# Patient Record
Sex: MALE | Race: WHITE | NOT HISPANIC OR LATINO | Employment: OTHER | URBAN - METROPOLITAN AREA
[De-identification: names, ages, dates, MRNs, and addresses within clinical notes are randomized per-mention and may not be internally consistent; named-entity substitution may affect disease eponyms.]

---

## 2022-04-11 ENCOUNTER — OFFICE VISIT (OUTPATIENT)
Dept: WOUND CARE | Facility: HOSPITAL | Age: 67
End: 2022-04-11
Attending: SURGERY
Payer: MEDICARE

## 2022-04-11 VITALS
HEART RATE: 86 BPM | SYSTOLIC BLOOD PRESSURE: 125 MMHG | DIASTOLIC BLOOD PRESSURE: 72 MMHG | TEMPERATURE: 97 F | RESPIRATION RATE: 18 BRPM

## 2022-04-11 DIAGNOSIS — L97.512 CHRONIC ULCER OF TOE OF RIGHT FOOT WITH FAT LAYER EXPOSED: ICD-10-CM

## 2022-04-11 DIAGNOSIS — L97.519: ICD-10-CM

## 2022-04-11 DIAGNOSIS — I70.235 ATHEROSCLEROSIS OF NATIVE ARTERY OF RIGHT LOWER EXTREMITY WITH ULCERATION OF OTHER PART OF FOOT: ICD-10-CM

## 2022-04-11 PROBLEM — I70.239 ATHEROSCLEROSIS OF NATIVE ARTERY OF RIGHT LOWER EXTREMITY WITH ULCERATION: Status: ACTIVE | Noted: 2022-04-11

## 2022-04-11 PROCEDURE — 3078F DIAST BP <80 MM HG: CPT | Mod: CPTII,,, | Performed by: SURGERY

## 2022-04-11 PROCEDURE — 3078F PR MOST RECENT DIASTOLIC BLOOD PRESSURE < 80 MM HG: ICD-10-PCS | Mod: CPTII,,, | Performed by: SURGERY

## 2022-04-11 PROCEDURE — 3074F PR MOST RECENT SYSTOLIC BLOOD PRESSURE < 130 MM HG: ICD-10-PCS | Mod: CPTII,,, | Performed by: SURGERY

## 2022-04-11 PROCEDURE — 99499 UNLISTED E&M SERVICE: CPT | Mod: ,,, | Performed by: SURGERY

## 2022-04-11 PROCEDURE — 99499 NO LOS: ICD-10-PCS | Mod: ,,, | Performed by: SURGERY

## 2022-04-11 PROCEDURE — 11042 DBRDMT SUBQ TIS 1ST 20SQCM/<: CPT

## 2022-04-11 PROCEDURE — 27201912 HC WOUND CARE DEBRIDEMENT SUPPLIES

## 2022-04-11 PROCEDURE — 4010F ACE/ARB THERAPY RXD/TAKEN: CPT | Mod: CPTII,,, | Performed by: SURGERY

## 2022-04-11 PROCEDURE — 4010F PR ACE/ARB THEARPY RXD/TAKEN: ICD-10-PCS | Mod: CPTII,,, | Performed by: SURGERY

## 2022-04-11 PROCEDURE — 99204 OFFICE O/P NEW MOD 45 MIN: CPT | Mod: 25

## 2022-04-11 PROCEDURE — 3074F SYST BP LT 130 MM HG: CPT | Mod: CPTII,,, | Performed by: SURGERY

## 2022-04-11 NOTE — ASSESSMENT & PLAN NOTE
Upon examination, the patient was noted to have a palpable posterior tibial pulse in the right foot.  He did have 1+ edema of the foot and lower extremity.  The right great toe wound had black and dark gray eschar with undetermined depth.  There was no swelling of the toe.  Betadine and alcohol will be applied to the toe.

## 2022-04-11 NOTE — H&P
[facility]  Wound Care  History and Physical    Problem List Items Addressed This Visit        Derm    Chronic ulcer of great toe of right foot, with unspecified severity    Overview     Patient began to experience pain in the toes of his right foot with development of blistering near the end of February 2022. He had been treated with multiple rounds of antibiotics.  Without improvement.  He was admitted 03/29/2022 at [facility] for right foot ischemia.  He underwent angiogram with percutaneous intervention in his right lower extremity with re-establishment of blood flow to the right foot.  The patient has noted significant reduction in his pain following the procedure with improvement of the wounds.  He has just completed a course of Levaquin and clindamycin.  He was given a prescription for doxycycline to begin today.  He has developed a wound on the dorsum of the right great toe as well as a wound between the 4th and 5th toe.  He does have chronic swelling of his right foot from a prior injury requiring ORIF metatarsal bones of the right foot.           Current Assessment & Plan     Upon examination, the patient was noted to have a palpable posterior tibial pulse in the right foot.  He did have 1+ edema of the foot and lower extremity.  The right great toe wound had black and dark gray eschar with undetermined depth.  There was no swelling of the toe.  Betadine and alcohol will be applied to the toe.           Chronic ulcer of 4th and 5th toe of right foot with fat layer exposed    Overview     Patient began to experience pain in the toes of his right foot with development of blistering near the end of February 2022. He had been treated with multiple rounds of antibiotics.  Without improvement.  He was admitted 03/29/2022 at [facility] for right foot ischemia.  He underwent angiogram with percutaneous intervention in his right lower extremity with  re-establishment of blood flow to the right foot.  The patient has noted significant reduction in his pain following the procedure with improvement of the wounds.  He has just completed a course of Levaquin and clindamycin.  He was given a prescription for doxycycline to begin today.  He has developed a wound on the dorsum of the right great toe as well as a wound between the 4th and 5th toe.  He does have chronic swelling of his right foot from a prior injury requiring ORIF metatarsal bones of the right foot.           Current Assessment & Plan     Wounds between the 4th and 5th toe of the right foot have modest amount of nonviable tissue.  Debridement was performed to remove nonviable tissue.  Begin Santyl.  Gentian violet to the periwound skin.              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  This is been treated numerous times over the years.  Patient completed percutaneous intervention of the right lower extremity in early April 2022.  Patient does have known vascular disease of the left lower extremity which is also going to be addressed.           Current Assessment & Plan     Patient now with palpable pulse of the right posterior tibial and improved blood flow.  Improved wound healing is to be expected.  The left lower extremity is being addressed by [facility].  There currently are no wounds or pain on the left lower extremity associated with vascular disease.                   History:    Past Medical History:   Diagnosis Date    Atherosclerosis of artery of both lower extremities     Other hyperlipidemia        Past Surgical History:   Procedure Laterality Date    ORIF of left femur      Percutaneous angioplasty right femoral artery         No family history on file.     has no history on file for tobacco use, alcohol use, and drug use.    currently has no medications  in their medication list.    Allergies:  Patient has no allergy information on record.    Review of Systems:  Review of Systems   Constitutional: Negative for chills, diaphoresis, fever and weight loss.   HENT: Negative for nosebleeds and sore throat.    Eyes: Negative for photophobia and pain.   Respiratory: Negative for cough, hemoptysis and shortness of breath.    Cardiovascular: Negative for chest pain and orthopnea.   Gastrointestinal: Negative for abdominal pain, nausea and vomiting.   Genitourinary: Negative for dysuria and hematuria.   Musculoskeletal: Negative for falls and neck pain.   Skin: Negative for itching and rash.   Neurological: Negative for focal weakness and seizures.   Endo/Heme/Allergies: Does not bruise/bleed easily.   Psychiatric/Behavioral: Negative for hallucinations.         Vitals:    04/11/22 1256   BP: 125/72   Pulse: 86   Resp: 18   Temp: 97.3 °F (36.3 °C)         BMI:  There is no height or weight on file to calculate BMI.    Physical Exam:  Physical Exam  Vitals reviewed.   Constitutional:       General: He is not in acute distress.     Appearance: Normal appearance.   HENT:      Head: Normocephalic and atraumatic.      Mouth/Throat:      Mouth: Mucous membranes are moist.   Eyes:      General: No scleral icterus.     Extraocular Movements: Extraocular movements intact.      Pupils: Pupils are equal, round, and reactive to light.   Cardiovascular:      Rate and Rhythm: Normal rate and regular rhythm.      Comments: Patient has a palpable right femoral pulse.  Right posterior tibial pulses also palpable and bounding.  The patient does not have a palpable dorsalis pedis pulse on the right.  I am unable to identify palpable pulses in the left foot.  Pulmonary:      Effort: Pulmonary effort is normal. No respiratory distress.      Breath sounds: Normal breath sounds.   Abdominal:      General: Abdomen is flat. Bowel sounds are normal.      Palpations: Abdomen is soft.      Tenderness:  There is no abdominal tenderness.   Musculoskeletal:      Cervical back: Normal range of motion and neck supple.      Right lower leg: Edema (1+) present.      Comments: Patient has limited movement of his right ankle from prior injury.  There is a scar on the medial right foot from prior surgery.  See wound progress note for detailed wound description.     Lymphadenopathy:      Cervical: No cervical adenopathy.   Skin:     Comments: Patient has very poor capillary refill the left foot.  The right foot also has delayed capillary refill the right great toe.   Neurological:      General: No focal deficit present.      Mental Status: He is alert and oriented to person, place, and time.   Psychiatric:         Thought Content: Thought content normal.         Judgment: Judgment normal.         A1C:  No results for input(s): HGBA1C in the last 2160 hours.  BMP:  No results for input(s): GLU, NA, K, CL, CO2, BUN, CREATININE, CALCIUM, MG in the last 2160 hours.   CBC:  No results for input(s): WBC, RBC, HGB, HCT, PLT, MCV, MCH, MCHC in the last 2160 hours.  CMP:  No results for input(s): GLU, CALCIUM, ALBUMIN, PROT, NA, K, CO2, CL, BUN, ALKPHOS, ALT, AST, BILITOT in the last 2160 hours.    Invalid input(s): CREATININ  PREALBUMIN:  No results for input(s): PREALBUMIN in the last 2160 hours.  WOUND CULTURES:  No results for input(s): LABAERO in the last 2160 hours.        Plan:  See Wound Docs note for plan and follow up.        Butch HARKINS Hebert [facility]

## 2022-04-11 NOTE — ASSESSMENT & PLAN NOTE
Patient now with palpable pulse of the right posterior tibial and improved blood flow.  Improved wound healing is to be expected.  The left lower extremity is being addressed by [facility].  There currently are no wounds or pain on the left lower extremity associated with vascular disease.

## 2022-04-11 NOTE — PROGRESS NOTES
[facility]  Wound Care  Progress Note    Problem List Items Addressed This Visit        Derm    Chronic ulcer of great toe of right foot, with unspecified severity    Overview     Patient began to experience pain in the toes of his right foot with development of blistering near the end of February 2022. He had been treated with multiple rounds of antibiotics.  Without improvement.  He was admitted 03/29/2022 at [facility] for right foot ischemia.  He underwent angiogram with percutaneous intervention in his right lower extremity with re-establishment of blood flow to the right foot.  The patient has noted significant reduction in his pain following the procedure with improvement of the wounds.  He has just completed a course of Levaquin and clindamycin.  He was given a prescription for doxycycline to begin today.  He has developed a wound on the dorsum of the right great toe as well as a wound between the 4th and 5th toe.  He does have chronic swelling of his right foot from a prior injury requiring ORIF metatarsal bones of the right foot.           Current Assessment & Plan     Upon examination, the patient was noted to have a palpable posterior tibial pulse in the right foot.  He did have 1+ edema of the foot and lower extremity.  The right great toe wound had black and dark gray eschar with undetermined depth.  There was no swelling of the toe.  Betadine and alcohol will be applied to the toe.           Chronic ulcer of 4th and 5th toe of right foot with fat layer exposed    Overview     Patient began to experience pain in the toes of his right foot with development of blistering near the end of February 2022. He had been treated with multiple rounds of antibiotics.  Without improvement.  He was admitted 03/29/2022 at [facility] for right foot ischemia.  He underwent angiogram with percutaneous intervention in his right lower extremity with  re-establishment of blood flow to the right foot.  The patient has noted significant reduction in his pain following the procedure with improvement of the wounds.  He has just completed a course of Levaquin and clindamycin.  He was given a prescription for doxycycline to begin today.  He has developed a wound on the dorsum of the right great toe as well as a wound between the 4th and 5th toe.  He does have chronic swelling of his right foot from a prior injury requiring ORIF metatarsal bones of the right foot.           Current Assessment & Plan     Wounds between the 4th and 5th toe of the right foot have modest amount of nonviable tissue.  Debridement was performed to remove nonviable tissue.  Begin Santyl.  Gentian violet to the periwound skin.              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  This is been treated numerous times over the years.  Patient completed percutaneous intervention of the right lower extremity in early April 2022.  Patient does have known vascular disease of the left lower extremity which is also going to be addressed.           Current Assessment & Plan     Patient now with palpable pulse of the right posterior tibial and improved blood flow.  Improved wound healing is to be expected.  The left lower extremity is being addressed by [facility].  There currently are no wounds or pain on the left lower extremity associated with vascular disease.                 See wound doc progress notes. Documents will be scanned.        Butch HARKINS Lopez  [facility]

## 2022-04-11 NOTE — ASSESSMENT & PLAN NOTE
Wounds between the 4th and 5th toe of the right foot have modest amount of nonviable tissue.  Debridement was performed to remove nonviable tissue.  Begin Santyl.  Gentian violet to the periwound skin.

## 2022-04-18 ENCOUNTER — OFFICE VISIT (OUTPATIENT)
Dept: WOUND CARE | Facility: HOSPITAL | Age: 67
End: 2022-04-18
Attending: SURGERY
Payer: MEDICARE

## 2022-04-18 VITALS
RESPIRATION RATE: 18 BRPM | HEART RATE: 87 BPM | DIASTOLIC BLOOD PRESSURE: 77 MMHG | TEMPERATURE: 98 F | SYSTOLIC BLOOD PRESSURE: 128 MMHG

## 2022-04-18 DIAGNOSIS — L97.519: ICD-10-CM

## 2022-04-18 DIAGNOSIS — I70.235 ATHEROSCLEROSIS OF NATIVE ARTERY OF RIGHT LOWER EXTREMITY WITH ULCERATION OF OTHER PART OF FOOT: Primary | ICD-10-CM

## 2022-04-18 DIAGNOSIS — L97.512 CHRONIC ULCER OF TOE OF RIGHT FOOT WITH FAT LAYER EXPOSED: ICD-10-CM

## 2022-04-18 PROCEDURE — 99499 NO LOS: ICD-10-PCS | Mod: ,,, | Performed by: SURGERY

## 2022-04-18 PROCEDURE — 99499 UNLISTED E&M SERVICE: CPT | Mod: ,,, | Performed by: SURGERY

## 2022-04-18 PROCEDURE — 3078F DIAST BP <80 MM HG: CPT | Mod: CPTII,,, | Performed by: SURGERY

## 2022-04-18 PROCEDURE — 3074F SYST BP LT 130 MM HG: CPT | Mod: CPTII,,, | Performed by: SURGERY

## 2022-04-18 PROCEDURE — 4010F PR ACE/ARB THEARPY RXD/TAKEN: ICD-10-PCS | Mod: CPTII,,, | Performed by: SURGERY

## 2022-04-18 PROCEDURE — 3074F PR MOST RECENT SYSTOLIC BLOOD PRESSURE < 130 MM HG: ICD-10-PCS | Mod: CPTII,,, | Performed by: SURGERY

## 2022-04-18 PROCEDURE — 4010F ACE/ARB THERAPY RXD/TAKEN: CPT | Mod: CPTII,,, | Performed by: SURGERY

## 2022-04-18 PROCEDURE — 11042 DBRDMT SUBQ TIS 1ST 20SQCM/<: CPT

## 2022-04-18 PROCEDURE — 3078F PR MOST RECENT DIASTOLIC BLOOD PRESSURE < 80 MM HG: ICD-10-PCS | Mod: CPTII,,, | Performed by: SURGERY

## 2022-04-18 PROCEDURE — 27201912 HC WOUND CARE DEBRIDEMENT SUPPLIES

## 2022-04-18 NOTE — PROGRESS NOTES
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  This is been treated numerous times over the years.  Patient completed percutaneous intervention of the right lower extremity in early April 2022.  Patient does have known vascular disease of the left lower extremity which is also going to be addressed.              Orthopedic    Chronic ulcer of great toe of right foot, with unspecified severity    Overview     Patient began to experience pain in the toes of his right foot with development of blistering near the end of February 2022. He had been treated with multiple rounds of antibiotics.  Without improvement.  He was admitted 03/29/2022 at [facility] for right foot ischemia.  He underwent angiogram with percutaneous intervention in his right lower extremity with re-establishment of blood flow to the right foot.  The patient has noted significant reduction in his pain following the procedure with improvement of the wounds.  He has just completed a course of Levaquin and clindamycin.  He was given a prescription for doxycycline to begin today.  He has developed a wound on the dorsum of the right great toe as well as a wound between the 4th and 5th toe.  He does have chronic swelling of his right foot from a prior injury requiring ORIF metatarsal bones of the right foot.           Current Assessment & Plan     Wound is drier.  There is less erythema around the wound.  Excisional debridement performed.  Continue Santyl.           Chronic ulcer of 4th and 5th toe of right foot with fat layer exposed    Overview     Patient began to experience pain in the toes of his right foot with development of blistering near the end of February 2022. He had been  treated with multiple rounds of antibiotics.  Without improvement.  He was admitted 03/29/2022 at [facility] for right foot ischemia.  He underwent angiogram with percutaneous intervention in his right lower extremity with re-establishment of blood flow to the right foot.  The patient has noted significant reduction in his pain following the procedure with improvement of the wounds.  He has just completed a course of Levaquin and clindamycin.  He was given a prescription for doxycycline to begin today.  He has developed a wound on the dorsum of the right great toe as well as a wound between the 4th and 5th toe.  He does have chronic swelling of his right foot from a prior injury requiring ORIF metatarsal bones of the right foot.           Current Assessment & Plan     Wound is markedly improved.  Excisional debridement performed to maintain active phase wound healing remove nonviable tissue.  Continue Santyl.                 See wound doc progress notes. Documents will be scanned.        Butch Lopez  [facility]

## 2022-04-18 NOTE — ASSESSMENT & PLAN NOTE
Wound is markedly improved.  Excisional debridement performed to maintain active phase wound healing remove nonviable tissue.  Continue Santyl.

## 2022-04-18 NOTE — ASSESSMENT & PLAN NOTE
Wound is drier.  There is less erythema around the wound.  Excisional debridement performed.  Continue Santyl.

## 2022-04-25 ENCOUNTER — OFFICE VISIT (OUTPATIENT)
Dept: WOUND CARE | Facility: HOSPITAL | Age: 67
End: 2022-04-25
Attending: SURGERY
Payer: MEDICARE

## 2022-04-25 VITALS
HEART RATE: 79 BPM | TEMPERATURE: 98 F | DIASTOLIC BLOOD PRESSURE: 69 MMHG | RESPIRATION RATE: 19 BRPM | SYSTOLIC BLOOD PRESSURE: 133 MMHG

## 2022-04-25 DIAGNOSIS — L97.512 CHRONIC ULCER OF TOE OF RIGHT FOOT WITH FAT LAYER EXPOSED: ICD-10-CM

## 2022-04-25 DIAGNOSIS — L97.519: ICD-10-CM

## 2022-04-25 PROCEDURE — 3078F PR MOST RECENT DIASTOLIC BLOOD PRESSURE < 80 MM HG: ICD-10-PCS | Mod: CPTII,,, | Performed by: SURGERY

## 2022-04-25 PROCEDURE — 27201912 HC WOUND CARE DEBRIDEMENT SUPPLIES

## 2022-04-25 PROCEDURE — 3078F DIAST BP <80 MM HG: CPT | Mod: CPTII,,, | Performed by: SURGERY

## 2022-04-25 PROCEDURE — 3075F PR MOST RECENT SYSTOLIC BLOOD PRESS GE 130-139MM HG: ICD-10-PCS | Mod: CPTII,,, | Performed by: SURGERY

## 2022-04-25 PROCEDURE — 3075F SYST BP GE 130 - 139MM HG: CPT | Mod: CPTII,,, | Performed by: SURGERY

## 2022-04-25 PROCEDURE — 4010F PR ACE/ARB THEARPY RXD/TAKEN: ICD-10-PCS | Mod: CPTII,,, | Performed by: SURGERY

## 2022-04-25 PROCEDURE — 11042 DBRDMT SUBQ TIS 1ST 20SQCM/<: CPT

## 2022-04-25 PROCEDURE — 4010F ACE/ARB THERAPY RXD/TAKEN: CPT | Mod: CPTII,,, | Performed by: SURGERY

## 2022-04-25 PROCEDURE — 99499 NO LOS: ICD-10-PCS | Mod: ,,, | Performed by: SURGERY

## 2022-04-25 PROCEDURE — 99499 UNLISTED E&M SERVICE: CPT | Mod: ,,, | Performed by: SURGERY

## 2022-04-25 NOTE — PROGRESS NOTES
[facility]  Wound Care  Progress Note    Problem List Items Addressed This Visit     Chronic ulcer of great toe of right foot, with unspecified severity    Overview     Patient began to experience pain in the toes of his right foot with development of blistering near the end of February 2022. He had been treated with multiple rounds of antibiotics.  Without improvement.  He was admitted 03/29/2022 at [facility] for right foot ischemia.  He underwent angiogram with percutaneous intervention in his right lower extremity with re-establishment of blood flow to the right foot.  The patient has noted significant reduction in his pain following the procedure with improvement of the wounds.  He has just completed a course of Levaquin and clindamycin.  He was given a prescription for doxycycline to begin today.  He has developed a wound on the dorsum of the right great toe as well as a wound between the 4th and 5th toe.  He does have chronic swelling of his right foot from a prior injury requiring ORIF metatarsal bones of the right foot.           Current Assessment & Plan     Significantly decreased pain.  Repeat angiogram schedule for beginning of May.  Scab overlying the right great toe came off this weekend to reveal a small full thickness arterial ulcer.  Wound is clean with no surrounding erythema.  Wound debrided today.  Will implement collagen dressing.           Chronic ulcer of 4th and 5th toe of right foot with fat layer exposed    Overview     Patient began to experience pain in the toes of his right foot with development of blistering near the end of February 2022. He had been treated with multiple rounds of antibiotics.  Without improvement.  He was admitted 03/29/2022 at [facility] for right foot ischemia.  He underwent angiogram with percutaneous intervention in his right lower extremity with re-establishment of blood flow to the right foot.  The  patient has noted significant reduction in his pain following the procedure with improvement of the wounds.  He has just completed a course of Levaquin and clindamycin.  He was given a prescription for doxycycline to begin today.  He has developed a wound on the dorsum of the right great toe as well as a wound between the 4th and 5th toe.  He does have chronic swelling of his right foot from a prior injury requiring ORIF metatarsal bones of the right foot.           Current Assessment & Plan     No open wound between the 4th and 5th toes at this time.  There is just some macerated tissue.  Will implement silver dressing.                 See wound doc progress notes. Documents will be scanned.        Antony ODELL Prieto  [facility]

## 2022-04-25 NOTE — ASSESSMENT & PLAN NOTE
Significantly decreased pain.  Repeat angiogram schedule for beginning of May.  Scab overlying the right great toe came off this weekend to reveal a small full thickness arterial ulcer.  Wound is clean with no surrounding erythema.  Wound debrided today.  Will implement collagen dressing.

## 2022-04-25 NOTE — ASSESSMENT & PLAN NOTE
No open wound between the 4th and 5th toes at this time.  There is just some macerated tissue.  Will implement silver dressing.

## 2022-05-09 ENCOUNTER — OFFICE VISIT (OUTPATIENT)
Dept: WOUND CARE | Facility: HOSPITAL | Age: 67
End: 2022-05-09
Attending: SURGERY
Payer: MEDICARE

## 2022-05-09 VITALS
DIASTOLIC BLOOD PRESSURE: 78 MMHG | RESPIRATION RATE: 18 BRPM | TEMPERATURE: 98 F | HEART RATE: 83 BPM | SYSTOLIC BLOOD PRESSURE: 115 MMHG

## 2022-05-09 DIAGNOSIS — L97.519: ICD-10-CM

## 2022-05-09 DIAGNOSIS — L97.512 CHRONIC ULCER OF TOE OF RIGHT FOOT WITH FAT LAYER EXPOSED: ICD-10-CM

## 2022-05-09 DIAGNOSIS — I70.235 ATHEROSCLEROSIS OF NATIVE ARTERY OF RIGHT LOWER EXTREMITY WITH ULCERATION OF OTHER PART OF FOOT: ICD-10-CM

## 2022-05-09 PROCEDURE — 3078F DIAST BP <80 MM HG: CPT | Mod: CPTII,,, | Performed by: SURGERY

## 2022-05-09 PROCEDURE — 11042 DBRDMT SUBQ TIS 1ST 20SQCM/<: CPT

## 2022-05-09 PROCEDURE — 4010F PR ACE/ARB THEARPY RXD/TAKEN: ICD-10-PCS | Mod: CPTII,,, | Performed by: SURGERY

## 2022-05-09 PROCEDURE — 3074F SYST BP LT 130 MM HG: CPT | Mod: CPTII,,, | Performed by: SURGERY

## 2022-05-09 PROCEDURE — 99499 UNLISTED E&M SERVICE: CPT | Mod: ,,, | Performed by: SURGERY

## 2022-05-09 PROCEDURE — 3078F PR MOST RECENT DIASTOLIC BLOOD PRESSURE < 80 MM HG: ICD-10-PCS | Mod: CPTII,,, | Performed by: SURGERY

## 2022-05-09 PROCEDURE — 99499 NO LOS: ICD-10-PCS | Mod: ,,, | Performed by: SURGERY

## 2022-05-09 PROCEDURE — 3074F PR MOST RECENT SYSTOLIC BLOOD PRESSURE < 130 MM HG: ICD-10-PCS | Mod: CPTII,,, | Performed by: SURGERY

## 2022-05-09 PROCEDURE — 27201912 HC WOUND CARE DEBRIDEMENT SUPPLIES

## 2022-05-09 PROCEDURE — 4010F ACE/ARB THERAPY RXD/TAKEN: CPT | Mod: CPTII,,, | Performed by: SURGERY

## 2022-05-09 NOTE — PROGRESS NOTES
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  This is been treated numerous times over the years.  Patient completed percutaneous intervention of the right lower extremity in early April 2022.  Patient does have known vascular disease of the left lower extremity which is also going to be addressed.           Current Assessment & Plan     Wounds are healing well.              Orthopedic    Chronic ulcer of great toe of right foot, with unspecified severity    Overview     Patient began to experience pain in the toes of his right foot with development of blistering near the end of February 2022. He had been treated with multiple rounds of antibiotics.  Without improvement.  He was admitted 03/29/2022 at [facility] for right foot ischemia.  He underwent angiogram with percutaneous intervention in his right lower extremity with re-establishment of blood flow to the right foot.  The patient has noted significant reduction in his pain following the procedure with improvement of the wounds.  He has just completed a course of Levaquin and clindamycin.  He was given a prescription for doxycycline to begin today.  He has developed a wound on the dorsum of the right great toe as well as a wound between the 4th and 5th toe.  He does have chronic swelling of his right foot from a prior injury requiring ORIF metatarsal bones of the right foot.  As of 05/09/2022, the wound is healed           Current Assessment & Plan     As of 05/09/2022, the wound is healed  Vaseline to the wound daily for 4 weeks           Chronic ulcer of 4th and 5th toe of right foot with fat layer exposed    Overview     Patient began to experience pain in the toes of his right foot with development  of blistering near the end of February 2022. He had been treated with multiple rounds of antibiotics.  Without improvement.  He was admitted 03/29/2022 at [facility] for right foot ischemia.  He underwent angiogram with percutaneous intervention in his right lower extremity with re-establishment of blood flow to the right foot.  The patient has noted significant reduction in his pain following the procedure with improvement of the wounds.  He has just completed a course of Levaquin and clindamycin.  He was given a prescription for doxycycline to begin today.  He has developed a wound on the dorsum of the right great toe as well as a wound between the 4th and 5th toe.  He does have chronic swelling of his right foot from a prior injury requiring ORIF metatarsal bones of the right foot.           Current Assessment & Plan     Wound on the 5th toe is now healed as of 05/09/2022.  The wound on the 4th toe is improving rapidly.  Continue debridement to remove nonviable tissue maintain active phase wound healing.  Continue silver alginate.  Expect wound to be healed in the next 1-2 weeks.                 See wound doc progress notes. Documents will be scanned.        Butch Lopez  [facility]

## 2022-05-09 NOTE — ASSESSMENT & PLAN NOTE
Wound on the 5th toe is now healed as of 05/09/2022.  The wound on the 4th toe is improving rapidly.  Continue debridement to remove nonviable tissue maintain active phase wound healing.  Continue silver alginate.  Expect wound to be healed in the next 1-2 weeks.

## 2022-05-23 ENCOUNTER — OFFICE VISIT (OUTPATIENT)
Dept: WOUND CARE | Facility: HOSPITAL | Age: 67
End: 2022-05-23
Attending: SURGERY
Payer: MEDICARE

## 2022-05-23 VITALS
RESPIRATION RATE: 18 BRPM | HEART RATE: 67 BPM | SYSTOLIC BLOOD PRESSURE: 112 MMHG | DIASTOLIC BLOOD PRESSURE: 59 MMHG | TEMPERATURE: 97 F

## 2022-05-23 DIAGNOSIS — I70.235 ATHEROSCLEROSIS OF NATIVE ARTERY OF RIGHT LOWER EXTREMITY WITH ULCERATION OF OTHER PART OF FOOT: ICD-10-CM

## 2022-05-23 DIAGNOSIS — L97.512 CHRONIC ULCER OF TOE OF RIGHT FOOT WITH FAT LAYER EXPOSED: ICD-10-CM

## 2022-05-23 PROBLEM — L97.519: Status: RESOLVED | Noted: 2022-04-11 | Resolved: 2022-05-23

## 2022-05-23 PROCEDURE — 99499 NO LOS: ICD-10-PCS | Mod: ,,, | Performed by: SURGERY

## 2022-05-23 PROCEDURE — 3078F DIAST BP <80 MM HG: CPT | Mod: CPTII,,, | Performed by: SURGERY

## 2022-05-23 PROCEDURE — 4010F ACE/ARB THERAPY RXD/TAKEN: CPT | Mod: CPTII,,, | Performed by: SURGERY

## 2022-05-23 PROCEDURE — 3074F PR MOST RECENT SYSTOLIC BLOOD PRESSURE < 130 MM HG: ICD-10-PCS | Mod: CPTII,,, | Performed by: SURGERY

## 2022-05-23 PROCEDURE — 99213 OFFICE O/P EST LOW 20 MIN: CPT

## 2022-05-23 PROCEDURE — 4010F PR ACE/ARB THEARPY RXD/TAKEN: ICD-10-PCS | Mod: CPTII,,, | Performed by: SURGERY

## 2022-05-23 PROCEDURE — 1159F MED LIST DOCD IN RCRD: CPT | Mod: CPTII,,, | Performed by: SURGERY

## 2022-05-23 PROCEDURE — 3078F PR MOST RECENT DIASTOLIC BLOOD PRESSURE < 80 MM HG: ICD-10-PCS | Mod: CPTII,,, | Performed by: SURGERY

## 2022-05-23 PROCEDURE — 99499 UNLISTED E&M SERVICE: CPT | Mod: ,,, | Performed by: SURGERY

## 2022-05-23 PROCEDURE — 1159F PR MEDICATION LIST DOCUMENTED IN MEDICAL RECORD: ICD-10-PCS | Mod: CPTII,,, | Performed by: SURGERY

## 2022-05-23 PROCEDURE — 3074F SYST BP LT 130 MM HG: CPT | Mod: CPTII,,, | Performed by: SURGERY

## 2022-05-23 NOTE — PROGRESS NOTES
Ice, elevate, rest, tylenol for pain.     Crutches for the next few days to see if that helps with pain.     Patient to follow up with primary care provider for recheck in 1 week.   Patient INR today was 2.2 within normal range for being on warfarin.    See wound care assessment documentation in chart review. Scanned under media tab.

## 2022-05-23 NOTE — PROGRESS NOTES
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  This is been treated numerous times over the years.  Patient completed percutaneous intervention of the right lower extremity in early April 2022.  Patient does have known vascular disease of the left lower extremity which is also going to be addressed.    As of 05/23/2022, the wound is healed.              Orthopedic    Chronic ulcer of 4th and 5th toe of right foot with fat layer exposed    Overview     Patient began to experience pain in the toes of his right foot with development of blistering near the end of February 2022. He had been treated with multiple rounds of antibiotics.  Without improvement.  He was admitted 03/29/2022 at [facility] for right foot ischemia.  He underwent angiogram with percutaneous intervention in his right lower extremity with re-establishment of blood flow to the right foot.  The patient has noted significant reduction in his pain following the procedure with improvement of the wounds.  He has just completed a course of Levaquin and clindamycin.  He was given a prescription for doxycycline to begin today.  He has developed a wound on the dorsum of the right great toe as well as a wound between the 4th and 5th toe.  He does have chronic swelling of his right foot from a prior injury requiring ORIF metatarsal bones of the right foot.    As of 05/23/2022, the wound is healed.           Current Assessment & Plan       Wound is healed.      Vaseline to the 4th webspace daily for 3-4 weeks.  Return as needed.  Continue follow-up with [facility] regarding atherosclerotic disease.                 See wound doc progress notes. Documents will be scanned.        Butch Lopez  [facility]

## 2022-05-23 NOTE — ASSESSMENT & PLAN NOTE
Wound is healed.      Vaseline to the 4th webspace daily for 3-4 weeks.  Return as needed.  Continue follow-up with [facility] regarding atherosclerotic disease.

## 2024-10-22 ENCOUNTER — TELEPHONE (OUTPATIENT)
Dept: TRANSPLANT | Facility: CLINIC | Age: 69
End: 2024-10-22
Payer: MEDICARE

## 2024-10-22 NOTE — TELEPHONE ENCOUNTER
Pt records reviewed.  Pt will be referred to Hepatology due to elevated lfts. Hx lung cancer, no evidence of reason for elevated lfts.   Initial referral received  from DR Mcgee Christine   Referral letter sent to patient.      RECORDS SCANNED IN MEDIA UNDER HEPATOLOGY REFERRAL .

## 2024-10-22 NOTE — LETTER
October 22, 2024    Hansel Rubio  [address] [place] [address]      Dear Hansel Rubio:    Your doctor has referred you to the [facility]. We are sending this letter to remind you to make an appointment with us to complete the referral process.     Please call us at [phone] to schedule an appointment. We look forward to seeing you soon.     If you received a call and have been scheduled, please disregard this letter.       Sincerely,        [facility]   [employer] [facility] [place] [address]  [phone]

## 2024-10-22 NOTE — TELEPHONE ENCOUNTER
An appointment have been scheduled with Nettie Phillips on Tuesday, January 28, 2025 at 1:30PM.  Patient confirmed.  A copy of the appointment have been mailed out.

## 2025-01-13 ENCOUNTER — TELEPHONE (OUTPATIENT)
Dept: HEPATOLOGY | Facility: CLINIC | Age: 70
End: 2025-01-13
Payer: MEDICARE

## 2025-01-13 NOTE — TELEPHONE ENCOUNTER
Returned pt calls stated that he would like to cancell it.Pcp told him no reason to come to hep clinic.

## 2025-01-13 NOTE — TELEPHONE ENCOUNTER
----- Message from O Entregador sent at 1/13/2025  8:39 AM CST -----  Regarding: Reschedule Existing Appointment  Contact: :Hansel Rubio  Reschedule Existing Appointment     Current appt date:01/28     Type of appt :NP     Physician:Alan     Reason for rescheduling:Patient is calling to reschedule due to transportation. Requesting a call back     Caller:Hansel Rubio      Contact Preference:[phone]